# Patient Record
(demographics unavailable — no encounter records)

---

## 2025-06-12 NOTE — ASSESSMENT
[FreeTextEntry1] : 12 year old with long-standing asymmetry of size (girth) of thigh/leg and post concussion headaches (now resolved), found to have a L parietal arachnoid cyst on head CT/brain MRI (during workup for headaches), stable on recent repeat brain MRI. Normal MRI of lumbar spine. Intermittent, infrequent headaches are likely migraine with not eating all day as a trigger PLAN: Abortive headache Tx: Ibuprofen 400 mg q 8 hrs prn or Aleve 440 mg q 12 hrs prn; or Naproxen 500 mg q 12 hrs prn (take with food prevent gastric irritation) Side effects and potential to cause rebound headaches with frequent use (> twice a week) discussed with parent Keep HA diary to document  frequency, identify triggers and response to medication Behavioral measures to avoid headaches (maintaining regular eating and sleeping habits, avoiding known triggers) discussed with parent and patient Will see back in 2 months

## 2025-06-12 NOTE — HISTORY OF PRESENT ILLNESS
[FreeTextEntry1] : Patient denies having headaches but parent thinks headaches are happening here and there (every 2 months) Headaches described as pounding in the front or back of the head, associated with photophobia and phonophobia  Not eating breakfast or lunch

## 2025-06-12 NOTE — PHYSICAL EXAM
[Well-appearing] : well-appearing [Conversant] : conversant [Normal speech and language] : normal speech and language [Full extraocular movements] : full extraocular movements [No nystagmus] : no nystagmus [No papilledema] : no papilledema [No facial asymmetry or weakness] : no facial asymmetry or weakness [Gross hearing intact] : gross hearing intact [Normal axial and appendicular muscle tone] : normal axial and appendicular muscle tone [Gets up on table without difficulty] : gets up on table without difficulty [No pronator drift] : no pronator drift [No abnormal involuntary movements] : no abnormal involuntary movements [5/5 strength in proximal and distal muscles of arms and legs] : 5/5 strength in proximal and distal muscles of arms and legs [2+ biceps] : 2+ biceps [Knee jerks] : knee jerks [Localizes LT and temperature] : localizes LT and temperature [No dysmetria on FTNT] : no dysmetria on FTNT [Normal gait] : normal gait [Able to tandem well] : able to tandem well [Negative Romberg] : negative Romberg